# Patient Record
Sex: FEMALE | Race: WHITE | URBAN - NONMETROPOLITAN AREA
[De-identification: names, ages, dates, MRNs, and addresses within clinical notes are randomized per-mention and may not be internally consistent; named-entity substitution may affect disease eponyms.]

---

## 2024-06-20 ENCOUNTER — OFFICE VISIT (OUTPATIENT)
Dept: FAMILY MEDICINE CLINIC | Facility: CLINIC | Age: 54
End: 2024-06-20

## 2024-06-20 VITALS
TEMPERATURE: 99 F | SYSTOLIC BLOOD PRESSURE: 110 MMHG | DIASTOLIC BLOOD PRESSURE: 70 MMHG | HEART RATE: 78 BPM | HEIGHT: 63.25 IN | WEIGHT: 145 LBS | BODY MASS INDEX: 25.37 KG/M2

## 2024-06-20 DIAGNOSIS — F43.9 STRESS: ICD-10-CM

## 2024-06-20 DIAGNOSIS — M79.604 PAIN OF RIGHT LOWER EXTREMITY: Primary | ICD-10-CM

## 2024-06-20 DIAGNOSIS — M54.2 NECK PAIN: ICD-10-CM

## 2024-06-20 DIAGNOSIS — G57.03 PIRIFORMIS SYNDROME OF BOTH SIDES: ICD-10-CM

## 2024-06-20 DIAGNOSIS — M62.9 HAMSTRING TIGHTNESS OF BOTH LOWER EXTREMITIES: ICD-10-CM

## 2024-06-20 PROCEDURE — 3074F SYST BP LT 130 MM HG: CPT | Performed by: FAMILY MEDICINE

## 2024-06-20 PROCEDURE — 99214 OFFICE O/P EST MOD 30 MIN: CPT | Performed by: FAMILY MEDICINE

## 2024-06-20 PROCEDURE — 3008F BODY MASS INDEX DOCD: CPT | Performed by: FAMILY MEDICINE

## 2024-06-20 PROCEDURE — 3078F DIAST BP <80 MM HG: CPT | Performed by: FAMILY MEDICINE

## 2024-06-20 PROCEDURE — 98929 OSTEOPATH MANJ 9-10 REGIONS: CPT | Performed by: FAMILY MEDICINE

## 2024-06-20 NOTE — PROGRESS NOTES
Subjective:   Patient ID: Kel Lott is a 53 year old female.  Patient with chronic right hip, knee, ankle pain.  Left lateral aspect.  Denies trauma.  Has had for years.  Worse with exercise.  Enjoys lifting weights.  Active.   x 3 years.  Denies numbness, tingling, weakness.  Has seen specialist without relief.  HPI    History/Other:   Review of Systems  No current outpatient medications on file.     Allergies:Not on File    Objective:   Physical Exam  Vitals reviewed.   Constitutional:       Appearance: Normal appearance.   Cardiovascular:      Rate and Rhythm: Normal rate and regular rhythm.      Pulses: Normal pulses.      Heart sounds: Normal heart sounds.   Pulmonary:      Effort: Pulmonary effort is normal.      Breath sounds: Normal breath sounds.   Abdominal:      General: Bowel sounds are normal.      Palpations: Abdomen is soft.   Musculoskeletal:      Cervical back: Neck supple.   Neurological:      General: No focal deficit present.      Mental Status: She is alert and oriented to person, place, and time.      Comments: Negative straight leg raising       /70   Pulse 78   Temp 98.7 °F (37.1 °C) (Temporal)   Ht 5' 3.25\" (1.607 m)   Wt 145 lb (65.8 kg)   BMI 25.48 kg/m²   333 arm right, leg right  Psoas/glutes 0/0  Hamstring 30 degrees  Decreased psoas flexed position  Quad 0/0  Decreased quadratus lumborum  Tight calf  Decreased tibialis   Decrease shoulder/sternocleidomastoid  Elevated first rib  Piriformis 90 degrees  Tight thoracic, lumbar paraspinal musculature  Decrease hamstring  Activations x 9  Abdominal breathing  Job relaxed  Psoas/glutes 2/2  Quad 2/2  Psoas intact flexed position  Quadratus lumborum intact  Decreased calf tightness  Tibialis intact    shoulder/sternocleidomastoid intact   Lower first rib  Piriformis 130 degrees  Hamstring intact decreased tightness thoracic, lumbar paraspinal musculature    45-minute appointment with greater than half the visit spent  with counseling, positive mental imaging.  Breathing.  Breathing with activity.  No control.  Self worth.  Assessment & Plan:   1. Pain of right lower extremity    2. Piriformis syndrome of both sides    3. Neck pain    4. Hamstring tightness of both lower extremities    5. Stress        No orders of the defined types were placed in this encounter.      Meds This Visit:  Requested Prescriptions      No prescriptions requested or ordered in this encounter       Imaging & Referrals:  None

## 2024-12-04 ENCOUNTER — TELEPHONE (OUTPATIENT)
Dept: DERMATOLOGY | Age: 54
End: 2024-12-04

## 2024-12-09 ENCOUNTER — APPOINTMENT (OUTPATIENT)
Dept: DERMATOLOGY | Age: 54
End: 2024-12-09

## 2024-12-09 DIAGNOSIS — L73.8 SEBACEOUS HYPERPLASIA: Primary | ICD-10-CM

## 2024-12-10 ENCOUNTER — OFFICE VISIT (OUTPATIENT)
Dept: FAMILY MEDICINE CLINIC | Facility: CLINIC | Age: 54
End: 2024-12-10
Payer: COMMERCIAL

## 2024-12-10 VITALS
SYSTOLIC BLOOD PRESSURE: 128 MMHG | TEMPERATURE: 97 F | HEART RATE: 70 BPM | DIASTOLIC BLOOD PRESSURE: 72 MMHG | WEIGHT: 139 LBS | OXYGEN SATURATION: 99 % | BODY MASS INDEX: 24 KG/M2

## 2024-12-10 DIAGNOSIS — M54.2 NECK PAIN: ICD-10-CM

## 2024-12-10 DIAGNOSIS — G57.03 PIRIFORMIS SYNDROME OF BOTH SIDES: Primary | ICD-10-CM

## 2024-12-10 DIAGNOSIS — M62.9 HAMSTRING TIGHTNESS OF BOTH LOWER EXTREMITIES: ICD-10-CM

## 2024-12-10 PROCEDURE — 99214 OFFICE O/P EST MOD 30 MIN: CPT | Performed by: FAMILY MEDICINE

## 2024-12-10 PROCEDURE — 3078F DIAST BP <80 MM HG: CPT | Performed by: FAMILY MEDICINE

## 2024-12-10 PROCEDURE — 3074F SYST BP LT 130 MM HG: CPT | Performed by: FAMILY MEDICINE

## 2024-12-10 PROCEDURE — 98929 OSTEOPATH MANJ 9-10 REGIONS: CPT | Performed by: FAMILY MEDICINE

## 2024-12-10 NOTE — PROGRESS NOTES
Subjective:   Patient ID: Kel Lott is a 54 year old female.    HPI  Patient was doing well with hip pain until the last 4 to 6 weeks.  Stress stable.  Denies trauma.  Enjoys lifting, combat.  Right greater than left hip.  Denies numbness, tingling, weakness.  Without bowel or bladder changes.  History/Other:   Review of Systems  No current outpatient medications on file.     Allergies:Allergies[1]    Objective:   Physical Exam  Vitals reviewed.   Constitutional:       Appearance: Normal appearance.   Cardiovascular:      Rate and Rhythm: Normal rate and regular rhythm.      Pulses: Normal pulses.      Heart sounds: Normal heart sounds.   Pulmonary:      Effort: Pulmonary effort is normal.      Breath sounds: Normal breath sounds.   Abdominal:      General: Bowel sounds are normal.      Palpations: Abdomen is soft.   Musculoskeletal:      Cervical back: Neck supple. Tenderness present.   Skin:     General: Skin is warm and dry.   Neurological:      General: No focal deficit present.      Mental Status: She is alert and oriented to person, place, and time.      Comments: Negative straight leg raising     333 arm  Chest breathing  Psoas/glutes 0/0  Hamstring 45 degrees Quad 0/0  Elevated first rib  Decreased shoulder/sternocleidomastoid  Decreased quadratus lumborum  Jaw tight  Piriformis 90 degrees  Decrease hamstring  Tight lumbar, thoracic paraspinal musculature  Visuals decreased throughout  Activations x 9  Abdominal breathing  Psoas/glutes 2/2  Jaw relaxed  Hamstring 90 degrees Quad 2/2 quadratus lumborum intact    Shoulder/sternocleidomastoid intact  Piriformis 130 degrees  Decreased tightness lumbar, thoracic paraspinal musculature  Hamstring intact  Visuals intact throughout    45-minute appointment with greater than half the visit spent with counseling, positive mental imaging.  Breathing.  Breathing with activity.  No control.  Self worth.    Assessment & Plan:   1. Piriformis syndrome of both sides     2. Hamstring tightness of both lower extremities    3. Neck pain        No orders of the defined types were placed in this encounter.      Meds This Visit:  Requested Prescriptions      No prescriptions requested or ordered in this encounter       Imaging & Referrals:  None         [1] Not on File

## 2025-05-14 ENCOUNTER — APPOINTMENT (OUTPATIENT)
Dept: DERMATOLOGY | Age: 55
End: 2025-05-14